# Patient Record
Sex: FEMALE | Race: WHITE | Employment: FULL TIME | URBAN - METROPOLITAN AREA
[De-identification: names, ages, dates, MRNs, and addresses within clinical notes are randomized per-mention and may not be internally consistent; named-entity substitution may affect disease eponyms.]

---

## 2024-11-22 ENCOUNTER — HOSPITAL ENCOUNTER (OUTPATIENT)
Dept: CARDIOLOGY | Facility: HOSPITAL | Age: 59
Discharge: HOME | End: 2024-11-22
Payer: COMMERCIAL

## 2024-11-22 ENCOUNTER — APPOINTMENT (OUTPATIENT)
Dept: PRIMARY CARE | Facility: CLINIC | Age: 59
End: 2024-11-22

## 2024-11-22 ENCOUNTER — HOSPITAL ENCOUNTER (OUTPATIENT)
Dept: RADIOLOGY | Facility: HOSPITAL | Age: 59
Discharge: HOME | End: 2024-11-22
Payer: COMMERCIAL

## 2024-11-22 ENCOUNTER — HOSPITAL ENCOUNTER (OUTPATIENT)
Dept: VASCULAR MEDICINE | Facility: HOSPITAL | Age: 59
Discharge: HOME | End: 2024-11-22
Payer: COMMERCIAL

## 2024-11-22 VITALS
BODY MASS INDEX: 19.97 KG/M2 | HEART RATE: 71 BPM | HEIGHT: 64 IN | WEIGHT: 117 LBS | SYSTOLIC BLOOD PRESSURE: 102 MMHG | DIASTOLIC BLOOD PRESSURE: 60 MMHG | OXYGEN SATURATION: 98 %

## 2024-11-22 DIAGNOSIS — Z00.00 HEALTH MAINTENANCE EXAMINATION: ICD-10-CM

## 2024-11-22 DIAGNOSIS — Z13.6 ENCOUNTER FOR SCREENING FOR CARDIOVASCULAR DISORDERS: ICD-10-CM

## 2024-11-22 DIAGNOSIS — R09.89 OTHER SPECIFIED SYMPTOMS AND SIGNS INVOLVING THE CIRCULATORY AND RESPIRATORY SYSTEMS: ICD-10-CM

## 2024-11-22 LAB
25(OH)D3 SERPL-MCNC: 40 NG/ML (ref 30–100)
ALBUMIN SERPL BCP-MCNC: 4.6 G/DL (ref 3.4–5)
ALP SERPL-CCNC: 51 U/L (ref 33–110)
ALT SERPL W P-5'-P-CCNC: 11 U/L (ref 7–45)
ANION GAP SERPL CALC-SCNC: 9 MMOL/L (ref 10–20)
AST SERPL W P-5'-P-CCNC: 16 U/L (ref 9–39)
BASOPHILS # BLD AUTO: 0.02 X10*3/UL (ref 0–0.1)
BASOPHILS NFR BLD AUTO: 0.5 %
BILIRUB SERPL-MCNC: 0.6 MG/DL (ref 0–1.2)
BUN SERPL-MCNC: 14 MG/DL (ref 6–23)
CALCIUM SERPL-MCNC: 9.1 MG/DL (ref 8.6–10.3)
CHLORIDE SERPL-SCNC: 106 MMOL/L (ref 98–107)
CHOLEST SERPL-MCNC: 199 MG/DL (ref 0–199)
CHOLESTEROL/HDL RATIO: 3.4
CO2 SERPL-SCNC: 29 MMOL/L (ref 21–32)
CREAT SERPL-MCNC: 0.62 MG/DL (ref 0.5–1.05)
CRP SERPL HS-MCNC: 0.8 MG/L
EGFRCR SERPLBLD CKD-EPI 2021: >90 ML/MIN/1.73M*2
EOSINOPHIL # BLD AUTO: 0.07 X10*3/UL (ref 0–0.7)
EOSINOPHIL NFR BLD AUTO: 1.6 %
ERYTHROCYTE [DISTWIDTH] IN BLOOD BY AUTOMATED COUNT: 11.9 % (ref 11.5–14.5)
EST. AVERAGE GLUCOSE BLD GHB EST-MCNC: 97 MG/DL
FERRITIN SERPL-MCNC: 168 NG/ML (ref 8–150)
GLUCOSE SERPL-MCNC: 81 MG/DL (ref 74–99)
HBA1C MFR BLD: 5 %
HCT VFR BLD AUTO: 45.1 % (ref 36–46)
HDLC SERPL-MCNC: 57.9 MG/DL
HGB BLD-MCNC: 14.9 G/DL (ref 12–16)
IMM GRANULOCYTES # BLD AUTO: 0 X10*3/UL (ref 0–0.7)
IMM GRANULOCYTES NFR BLD AUTO: 0 % (ref 0–0.9)
INSULIN P FAST SERPL-ACNC: 4 UIU/ML (ref 3–25)
IRON SATN MFR SERPL: 27 % (ref 25–45)
IRON SERPL-MCNC: 84 UG/DL (ref 35–150)
LDLC SERPL CALC-MCNC: 130 MG/DL
LYMPHOCYTES # BLD AUTO: 1.58 X10*3/UL (ref 1.2–4.8)
LYMPHOCYTES NFR BLD AUTO: 37.1 %
MAGNESIUM SERPL-MCNC: 2.15 MG/DL (ref 1.6–2.4)
MCH RBC QN AUTO: 32 PG (ref 26–34)
MCHC RBC AUTO-ENTMCNC: 33 G/DL (ref 32–36)
MCV RBC AUTO: 97 FL (ref 80–100)
MONOCYTES # BLD AUTO: 0.26 X10*3/UL (ref 0.1–1)
MONOCYTES NFR BLD AUTO: 6.1 %
NEUTROPHILS # BLD AUTO: 2.33 X10*3/UL (ref 1.2–7.7)
NEUTROPHILS NFR BLD AUTO: 54.7 %
NON HDL CHOLESTEROL: 141 MG/DL (ref 0–149)
NRBC BLD-RTO: 0 /100 WBCS (ref 0–0)
PLATELET # BLD AUTO: 236 X10*3/UL (ref 150–450)
POTASSIUM SERPL-SCNC: 4 MMOL/L (ref 3.5–5.3)
PROT SERPL-MCNC: 6.6 G/DL (ref 6.4–8.2)
RBC # BLD AUTO: 4.65 X10*6/UL (ref 4–5.2)
SODIUM SERPL-SCNC: 140 MMOL/L (ref 136–145)
TIBC SERPL-MCNC: 307 UG/DL (ref 240–445)
TRIGL SERPL-MCNC: 55 MG/DL (ref 0–149)
TSH SERPL-ACNC: 0.95 MIU/L (ref 0.44–3.98)
UIBC SERPL-MCNC: 223 UG/DL (ref 110–370)
URATE SERPL-MCNC: 2 MG/DL (ref 2.3–6.7)
VIT B12 SERPL-MCNC: 1011 PG/ML (ref 211–911)
VLDL: 11 MG/DL (ref 0–40)
WBC # BLD AUTO: 4.3 X10*3/UL (ref 4.4–11.3)

## 2024-11-22 PROCEDURE — 71046 X-RAY EXAM CHEST 2 VIEWS: CPT

## 2024-11-22 PROCEDURE — 86141 C-REACTIVE PROTEIN HS: CPT

## 2024-11-22 PROCEDURE — 82172 ASSAY OF APOLIPOPROTEIN: CPT

## 2024-11-22 PROCEDURE — 83550 IRON BINDING TEST: CPT

## 2024-11-22 PROCEDURE — TIVIS TITMUS VISION: Performed by: INTERNAL MEDICINE

## 2024-11-22 PROCEDURE — 75571 CT HRT W/O DYE W/CA TEST: CPT

## 2024-11-22 PROCEDURE — 93880 EXTRACRANIAL BILAT STUDY: CPT

## 2024-11-22 PROCEDURE — 93017 CV STRESS TEST TRACING ONLY: CPT

## 2024-11-22 PROCEDURE — 82728 ASSAY OF FERRITIN: CPT

## 2024-11-22 PROCEDURE — BOMIX BODY MASS INDEX: Performed by: INTERNAL MEDICINE

## 2024-11-22 PROCEDURE — AUDIO AUDIO HEARING TEST: Performed by: INTERNAL MEDICINE

## 2024-11-22 PROCEDURE — 82306 VITAMIN D 25 HYDROXY: CPT

## 2024-11-22 PROCEDURE — 83036 HEMOGLOBIN GLYCOSYLATED A1C: CPT

## 2024-11-22 PROCEDURE — EXAM4 EXAM 4: Performed by: INTERNAL MEDICINE

## 2024-11-22 PROCEDURE — 85025 COMPLETE CBC W/AUTO DIFF WBC: CPT

## 2024-11-22 PROCEDURE — 84550 ASSAY OF BLOOD/URIC ACID: CPT

## 2024-11-22 PROCEDURE — BDTMS EXECUTIVE HEALTH BONE DENSITY TESTING /METRISCAN: Performed by: INTERNAL MEDICINE

## 2024-11-22 PROCEDURE — 83525 ASSAY OF INSULIN: CPT

## 2024-11-22 PROCEDURE — 83540 ASSAY OF IRON: CPT

## 2024-11-22 PROCEDURE — 80061 LIPID PANEL: CPT

## 2024-11-22 PROCEDURE — 83735 ASSAY OF MAGNESIUM: CPT

## 2024-11-22 PROCEDURE — 84443 ASSAY THYROID STIM HORMONE: CPT

## 2024-11-22 PROCEDURE — 82607 VITAMIN B-12: CPT

## 2024-11-22 PROCEDURE — 93978 VASCULAR STUDY: CPT

## 2024-11-22 PROCEDURE — 80053 COMPREHEN METABOLIC PANEL: CPT

## 2024-11-22 RX ORDER — AMMONIUM LACTATE 12 G/100G
LOTION TOPICAL 2 TIMES DAILY
COMMUNITY
Start: 2019-04-15

## 2024-11-22 RX ORDER — IBUPROFEN 100 MG/5ML
SUSPENSION, ORAL (FINAL DOSE FORM) ORAL
COMMUNITY

## 2024-11-22 RX ORDER — PEDIATRIC MULTIVITAMIN NO.238
TABLET,CHEWABLE ORAL
COMMUNITY

## 2024-11-22 RX ORDER — VALACYCLOVIR HYDROCHLORIDE 500 MG/1
500 TABLET, FILM COATED ORAL DAILY
COMMUNITY

## 2024-11-22 ASSESSMENT — ENCOUNTER SYMPTOMS
EYE REDNESS: 0
SINUS PRESSURE: 0
BLOOD IN STOOL: 0
CONSTIPATION: 0
FEVER: 0
APPETITE CHANGE: 0
AGITATION: 0
NERVOUS/ANXIOUS: 0
CHEST TIGHTNESS: 0
DIZZINESS: 0
PALPITATIONS: 0
EYE PAIN: 0
FREQUENCY: 0
ABDOMINAL PAIN: 0
JOINT SWELLING: 0
SORE THROAT: 0
ADENOPATHY: 0
DYSURIA: 0
FACIAL SWELLING: 0
HEADACHES: 0
DIARRHEA: 0
FLANK PAIN: 0
BRUISES/BLEEDS EASILY: 0
VOMITING: 0
POLYDIPSIA: 0
HEMATURIA: 0
FATIGUE: 0
ARTHRALGIAS: 0
SLEEP DISTURBANCE: 0
NUMBNESS: 0
TREMORS: 0
SHORTNESS OF BREATH: 0
WHEEZING: 0
APNEA: 0
COUGH: 0
BACK PAIN: 0
CONFUSION: 0
MYALGIAS: 0
NAUSEA: 0

## 2024-11-22 ASSESSMENT — PAIN SCALES - GENERAL: PAINLEVEL_OUTOF10: 0-NO PAIN

## 2024-11-22 NOTE — PROGRESS NOTES
Executive Physical         Patient ID: Erica Rendon is a 59 y.o. female who presents for Executive Evaluation:    The following report is in reference to your  executive physical examination which was held at [facility] on 11/22/24. Firstly, let me state that it was a pleasure meeting with you and that we appreciate you coming to [facility] for your executive evaluation.    At the time of your evaluation you were feeling well with no significant health concerns.    You were not complaining of fever ,chills, headache ,dizziness ,cough, chest pain shortness of breath, palpitations, nausea, vomiting, abdominal pain, loss of appetite, diarrhea, blood in the stools, frequent urination or painful urination.    Past Medical History:   Diagnosis Date    Personal history of other malignant neoplasm of skin     History of basal cell carcinoma         Review of Systems   Constitutional:  Negative for appetite change, fatigue and fever.   HENT:  Negative for congestion, ear discharge, ear pain, facial swelling, mouth sores, sinus pressure and sore throat.    Eyes:  Negative for pain, redness and visual disturbance.   Respiratory:  Negative for apnea, cough, chest tightness, shortness of breath and wheezing.    Cardiovascular:  Negative for chest pain, palpitations and leg swelling.   Gastrointestinal:  Negative for abdominal pain, blood in stool, constipation, diarrhea, nausea and vomiting.   Endocrine: Negative for polydipsia and polyuria.   Genitourinary:  Negative for dysuria, flank pain, frequency, hematuria and urgency.   Musculoskeletal:  Negative for arthralgias, back pain, gait problem, joint swelling and myalgias.   Skin:  Negative for pallor and rash.   Neurological:  Negative for dizziness, tremors, numbness and headaches.        Right upper extremity fasciculations   Hematological:  Negative for adenopathy. Does not bruise/bleed easily.  "  Psychiatric/Behavioral:  Negative for agitation, confusion and sleep disturbance. The patient is not nervous/anxious.            There is no problem list on file for this patient.       Past Surgical History:   Procedure Laterality Date    BREAST SURGERY      OTHER SURGICAL HISTORY  2019    Abdominoplasty    OTHER SURGICAL HISTORY  04/15/2019    Rhinoplasty    OTHER SURGICAL HISTORY  04/15/2019     section    OTHER SURGICAL HISTORY  2022    Corneal lasik    OTHER SURGICAL HISTORY  2022    Mohs surgery        Family History   Problem Relation Name Age of Onset    Colon cancer Mother      No Known Problems Father      Breast cancer Sister      Breast cancer Maternal Grandmother      Heart attack Paternal Grandfather          No Known Allergies       Current Outpatient Medications:     ammonium lactate (Lac-Hydrin) 12 % lotion, twice a day., Disp: , Rfl:     ascorbic acid (Vitamin C) 1,000 mg tablet, Take by mouth., Disp: , Rfl:     multivit with min-folic acid (Adult Multivitamin Gummies) 200 mcg tablet,chewable, Chew., Disp: , Rfl:     valACYclovir (Valtrex) 500 mg tablet, Take 1 tablet (500 mg) by mouth once daily., Disp: , Rfl:      Visit Vitals  /60 (BP Location: Left arm, Patient Position: Sitting, BP Cuff Size: Adult)   Pulse 71   Ht 1.626 m (5' 4\")   Wt 53.1 kg (117 lb)   SpO2 98%   BMI 20.08 kg/m²   Smoking Status Never   BSA 1.55 m²        Physical Exam  Constitutional:       General: She is not in acute distress.     Appearance: Normal appearance. She is not diaphoretic.   HENT:      Head: Normocephalic and atraumatic.      Right Ear: Tympanic membrane, ear canal and external ear normal.      Left Ear: Tympanic membrane, ear canal and external ear normal.      Nose: No congestion or rhinorrhea.      Mouth/Throat:      Mouth: Mucous membranes are moist.      Pharynx: Oropharynx is clear. No oropharyngeal exudate or posterior oropharyngeal erythema.   Eyes:      Extraocular " Movements: Extraocular movements intact.      Conjunctiva/sclera: Conjunctivae normal.      Pupils: Pupils are equal, round, and reactive to light.   Neck:      Vascular: No carotid bruit.   Cardiovascular:      Rate and Rhythm: Normal rate and regular rhythm.      Pulses: Normal pulses.      Heart sounds: Normal heart sounds. No murmur heard.     No friction rub.   Pulmonary:      Effort: No respiratory distress.      Breath sounds: No wheezing or rales.   Chest:      Chest wall: No tenderness.   Abdominal:      General: Abdomen is flat. Bowel sounds are normal. There is no distension.      Palpations: Abdomen is soft. There is no mass.      Tenderness: There is no abdominal tenderness. There is no guarding or rebound.      Hernia: No hernia is present.   Musculoskeletal:         General: No swelling, tenderness or deformity.      Cervical back: Normal range of motion and neck supple. No rigidity or tenderness.   Lymphadenopathy:      Cervical: No cervical adenopathy.   Skin:     General: Skin is warm.      Coloration: Skin is not jaundiced or pale.      Findings: No bruising, erythema, lesion or rash.      Comments: Thickening L palmar aspect btn 1st & 2nd digits   Neurological:      General: No focal deficit present.      Mental Status: She is alert and oriented to person, place, and time.      Motor: No weakness.      Coordination: Coordination normal.      Gait: Gait normal.      Deep Tendon Reflexes: Reflexes normal.   Psychiatric:         Mood and Affect: Mood normal.         Behavior: Behavior normal.        Ancillary studies:    Vision screening- Far 20/20,  Monovision    Bone density - Low- Normal     Audiogram -Decreased L sided  hearing     Discussion/Summary  In summary you are 59 y.o. female with a past medical history of non melanoma skin cancer .  At the time of your evaluation you were feeling well with no significant health concerns.      Physical examination including blood pressure ,  cardiovascular and body mass index/ % body fat  was unremarkable.    Lab studies including complete blood count, comprehensive metabolic panel, thyroid function,  Vitamin D, magnesium, iron, uric acid ,insulin, and inflammatory markers were normal.      Cardiology- Normal EKG, CT-Cardiac score, Exercise stress test, Carotid and Abdominal Aorta ultrasound studies.     Elevated total + LDL-cholesterol levels- Limit animal fats( red meat, cheese, shell fish,  egg yolks, full fat dairy/yoghurt and processed meats).  Instead, replace some of the saturated fats in your diet with healthier unsaturated fats, which are found in fish, nuts, avocados and vegetable oils, such as olive oil, canola oil and safflower oil.  Please see link below for additional information:    https://www.heartuk.org.uk/low-cholesterol-foods/choose-low-cholesterol-food      Elevated LP(a) which is an independent risk factor for heart disease which is genetic.There is no current treatment options to lower Lp(a) levels other than to control cholesterol levels.    Benign Fasciculations-this could be due to fatigue, stress, familial or idiopathic. Please observe the frequency and notify me or your PCP if these become more frequent.    L hand dupuytrens - this is a benign condition that usually gets worse over time.  There are different treatment options. They include:  ?Surger? - A doctor can remove or break apart the thick tissue.  ?A procedure - A doctor can stick a needle in your palm to break apart the thick tissue.  ?Medicine - You can get a shot of medicine in your palm. The medicine softens and breaks up the thick tissue.    Please notify me or your PCP if symptoms get worse and we will refer you to a hand orthopedic surgeon.    Skin - History of Basal cell carcinoma . Please follow up with dermatology for annual examination.Please use a broad-spectrum sunscreen with an SPF of 30 or higher. Monitor moles for ABCDE ( asymmetry, border  irregularity, color changes, diameter> pencil eraser and evolving )      Ocular Migraines -asymptomatic. Consider a trial of Magnesium glycinate 200 mg daily to prevent episodes.    Cancer screening- You are current with Colonoscopy , Mammogram, Pap smears and chest X-ray    Vaccine- I would  recommend a shingles (Shingrix) vaccine if not yet completed. In addition I would recommend a tetanus booster every 10 years to maintain immunity.  Recommend RSV at age 60 onwards and  Pneumonia vaccine (Prevnar 20) at age  65 . Consider COVID-19 booster and flu shot this  fall.      Wellness: Please continue with a balanced diet and a regular physical activity program for at least 150 minutes/week of moderate exercise and 30 minutes/week of resistance/weight training per week.  Try to get 6 to 8 hours of sleep per night.  Please download the Calm or Headspace or Unwinding Anxiety etelvina from the Etelvina Store to assist with stress and sleep management if necessary. Please download the seven minute exercise Etelvina to assist with stretching/strengthening exercises.    In conclusion,  I wish to thank you for attending the  executive health program at [facility].  I wish you and your family a safe and healthy Thanksgiving Holiday    Please email me at [email or web address] or call me at [phone] if you have any questions pertaining to this report or any other medical concerns.    Be Well    Dr MEETA Tucker and Ольга Fulton Master Clinician in Wellness    Senior Attending Physician , [facility] [place]    [facility]    Clinical     [employer]    [place], [place]    This note was partially generated using the Dragon voice recognition system.  There may be some incorrect wording ,grammar, spelling or punctuation errors that were not corrected prior to committing the note to the medical record.

## 2024-11-23 LAB — LPA SERPL-MCNC: 71 MG/DL
